# Patient Record
Sex: MALE | Race: WHITE | NOT HISPANIC OR LATINO | Employment: FULL TIME | URBAN - METROPOLITAN AREA
[De-identification: names, ages, dates, MRNs, and addresses within clinical notes are randomized per-mention and may not be internally consistent; named-entity substitution may affect disease eponyms.]

---

## 2022-03-04 ENCOUNTER — OFFICE VISIT (OUTPATIENT)
Dept: PEDIATRICS | Facility: CLINIC | Age: 57
End: 2022-03-04
Attending: NURSE PRACTITIONER
Payer: COMMERCIAL

## 2022-03-04 ENCOUNTER — HOSPITAL ENCOUNTER (OUTPATIENT)
Dept: ULTRASOUND IMAGING | Facility: CLINIC | Age: 57
Discharge: HOME | End: 2022-03-04
Attending: PHYSICIAN ASSISTANT | Admitting: PHYSICIAN ASSISTANT
Payer: COMMERCIAL

## 2022-03-04 ENCOUNTER — OFFICE VISIT (OUTPATIENT)
Dept: PEDIATRICS | Facility: CLINIC | Age: 57
End: 2022-03-04
Payer: COMMERCIAL

## 2022-03-04 VITALS
DIASTOLIC BLOOD PRESSURE: 70 MMHG | OXYGEN SATURATION: 98 % | HEART RATE: 63 BPM | TEMPERATURE: 96.7 F | WEIGHT: 167.4 LBS | HEIGHT: 70 IN | RESPIRATION RATE: 16 BRPM | SYSTOLIC BLOOD PRESSURE: 110 MMHG | BODY MASS INDEX: 23.96 KG/M2

## 2022-03-04 VITALS
TEMPERATURE: 97.9 F | DIASTOLIC BLOOD PRESSURE: 75 MMHG | BODY MASS INDEX: 23.96 KG/M2 | OXYGEN SATURATION: 100 % | WEIGHT: 167 LBS | SYSTOLIC BLOOD PRESSURE: 113 MMHG | RESPIRATION RATE: 18 BRPM | HEART RATE: 63 BPM

## 2022-03-04 DIAGNOSIS — M79.662 PAIN AND SWELLING OF LEFT LOWER LEG: ICD-10-CM

## 2022-03-04 DIAGNOSIS — M79.89 PAIN AND SWELLING OF LEFT LOWER LEG: ICD-10-CM

## 2022-03-04 DIAGNOSIS — M79.89 PAIN AND SWELLING OF LEFT LOWER LEG: Primary | ICD-10-CM

## 2022-03-04 DIAGNOSIS — M79.662 PAIN AND SWELLING OF LEFT LOWER LEG: Primary | ICD-10-CM

## 2022-03-04 DIAGNOSIS — M67.40 GANGLION CYST: ICD-10-CM

## 2022-03-04 PROCEDURE — 99204 OFFICE O/P NEW MOD 45 MIN: CPT | Performed by: PHYSICIAN ASSISTANT

## 2022-03-04 PROCEDURE — 99207 REFERRAL TO ACUTE AND DIAGNOSTIC SERVICES: CPT | Performed by: NURSE PRACTITIONER

## 2022-03-04 PROCEDURE — 93971 EXTREMITY STUDY: CPT | Mod: LT

## 2022-03-04 NOTE — PROGRESS NOTES
Assessment & Plan     Pain and swelling of left lower leg  Rule out left lower extremity DVT, referral to [facility] this morning at 10:15 am.  - Referral to Acute and Diagnostic Services (Day of diagnostic / First order acute); Future    Ganglion cyst  - Orthopedic  Referral; Future      37 minutes spent on the date of the encounter doing chart review, patient visit, documentation and discussion with other provider(s)        CONSULTATION/REFERRAL to Acute and Diagnostic Services    Return today (on 3/4/2022) for Acute Diagnostic Services.    LEILA Myers [facility] ALEJANDRA Aldana is a 56 year old who presents for the following health issues:    Reports a 5-7 day history of left calf pain and development of lump in calf x 2 days.  History of left knee problems, s/p meniscus repair. Typically has some clicking and popping in the left knee.  Was recently working on some home improvement projects requiring him to be on his knees. When placing pressure on his knee this typically results in knee swelling that sometimes affects the ankle as well. Started using sleeve in knee for compression. Following this he developed localized left calf pain. Initially thought it was a bruise but didn't recall any trauma. Then he noticed a lump. 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 Denies chest pain or shortness of breath.     Doesn't like going to the doctor, has not had a routine physical in several years. Behind on several routine health maintenance topics.     Daily meds:  Baby aspirin  Omega 3 fatty acid  Vitamin D  Quercetin with Bromelain  Geletin  Tumeric  Sometimes magnesium/zinc    There is no problem list on file for this patient.    History reviewed. No pertinent past medical history.    No current outpatient medications on file.     No  "current facility-administered medications for this visit.      No Known Allergies      Review of Systems    ROS: 10 point ROS neg other than the symptoms noted above in the HPI.        Objective    /70 (BP Location: Right arm, Patient Position: Sitting, Cuff Size: Adult Regular)   Pulse 63   Temp (!) 96.7  F (35.9  C) (Tympanic)   Resp 16   Ht 1.778 m (5' 10\")   Wt 75.9 kg (167 lb 6.4 oz)   SpO2 98%   BMI 24.02 kg/m    Body mass index is 24.02 kg/m .  Physical Exam  Constitutional:       General: He is not in acute distress.     Appearance: Normal appearance. He is not ill-appearing or toxic-appearing.   Cardiovascular:      Rate and Rhythm: Normal rate.   Pulmonary:      Effort: Pulmonary effort is normal. No respiratory distress.   Musculoskeletal:      Left lower leg: Swelling and tenderness present.      Left ankle: Swelling present.      Comments: Left medial calf with localized swelling from about 6 inches below knee extending upward, firm on palpation. No external skin changes.    Skin:     General: Skin is warm and dry.   Neurological:      General: No focal deficit present.      Mental Status: He is alert and oriented to person, place, and time.   Psychiatric:         Mood and Affect: Mood normal.         Behavior: Behavior normal.                    "

## 2022-03-04 NOTE — PROGRESS NOTES
"  Assessment/Plan:    No DVT on ultrasound. Complex fluid collection noted which corresponds to area of swelling/tenderness in L calf, nonspecific but may represent hematoma or abscess. No erythema or warmth, not fluctuant- do not suspect abscess. Pt admits he does bang his legs on the dashboard of his car frequently and \"doesn't think much of it\" so may have had trauma to the area. Suspect hematoma. Advised RICE, compression, NSAIDs/Tylenol PRN. F/u with ortho if not better in 1 week, may benefit from MRI at that point. Referral for ortho placed.     See patient instructions below.    At the end of the encounter, I discussed results, diagnosis, medications. Discussed red flags for immediate return to clinic/ER, as well as indications for follow up if no improvement. Patient understood and agreed to plan. Patient was stable for discharge.      ICD-10-CM    1. Pain and swelling of left lower leg  M79.662 Referral to Acute and Diagnostic Services (Day of diagnostic / First order acute)    M79.89 US Lower Extremity Venous Duplex Left     Orthopedic  Referral         Return in about 1 week (around 3/11/2022) for follow up with ortho.    MARCELLE Grady, PASANTOS  [facility]  -----------------------------------------------------------------------------------------------------------------------------------------------------    HPI:  Jovan Ely is a 56 year old male who presents for evaluation of the following:    Musculoskeletal problem/pain  Onset/Duration: X 1 week  Description  Location: L calf  Joint Swelling: YES, developed lump in calf 2 days ago  Redness: no   Pain: 1/10  Warmth: no   Progression of Symptoms:  same  Accompanying signs and symptoms:   Fevers: no   Numbness/tingling/weakness: no   History  Trauma to the area: no   Previous history of Gout: no    Alcohol usage: Moderate  Diuretic Use: no   Recent illness:  no   Previous similar problem:no    Previous " "evaluation:  no  Aggravating factors include: standing, walking, climbing stairs and exercise  Therapies tried and outcome:  elevating leg and compression sleeve has helped.    Seen at primary care office this AM. From their note:   \"History of left knee problems, s/p meniscus repair. Typically has some clicking and popping in the left knee.   Was recently working on some home improvement projects requiring him to be on his knees. When placing pressure on his knee this typically results in knee swelling that sometimes affects the ankle as well. Started using sleeve in knee for compression. Following this he developed localized left calf pain. Initially thought it was a bruise but didn't recall any trauma. Then he noticed a lump a couple of days ago. 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 Denies chest pain or shortness of breath. \"    Pt notes the knee itself has been swelling intermittently over the past couple of months, but swollen area in calf is new.  No prior hx of VTE.    History reviewed. No pertinent past medical history.    Vitals:    03/04/22 0955   BP: 113/75   BP Location: Left arm   Patient Position: Chair   Cuff Size: Adult Large   Pulse: 63   Resp: 18   Temp: 97.9  F (36.6  C)   TempSrc: Oral   SpO2: 100%   Weight: 75.8 kg (167 lb)       Physical Exam  Vitals and nursing note reviewed.   Cardiovascular:      Pulses:           Posterior tibial pulses are 2+ on the left side.   Pulmonary:      Effort: Pulmonary effort is normal.   Musculoskeletal:      Left knee: No swelling or effusion.      Left lower leg: Swelling (L ankle mildly larger than R ankle. no pitting) and tenderness present.        Legs:    Neurological:      Mental Status: He is alert.         Labs/Imaging:  No results found for this or any previous visit (from the past 24 hour(s)).  Results for " orders placed or performed during the hospital encounter of 03/04/22   US Lower Extremity Venous Duplex Left     Status: None (Preliminary result)    Narrative    US LOWER EXTREMITY VENOUS DUPLEX LEFT   3/4/2022 11:00 AM     HISTORY: Left calf pain and swelling.    COMPARISON: None.    TECHNIQUE: Spectral doppler and waveform analysis were performed on  the left lower extremity veins.    FINDINGS: The left common femoral, femoral, and popliteal veins are  patent, compressible, and negative for deep venous thrombosis. Calf  veins are segmentally seen but appear negative for DVT where  visualized. An elongated complex fluid collection in the left proximal  calf medially corresponds to the region of the patient's pain and  swelling, and measures 11.1 x 4.5 x 1.8 cm.      Impression    IMPRESSION:    1. No evidence for deep venous thrombosis in the left lower extremity  veins.   2. An elongated complex fluid collection in the left proximal calf  medially corresponds to the region of the patient's pain and swelling.  This finding is nonspecific, but may represent a hematoma or possibly  an abscess. Please clinically correlate.    I phone these results to ELIJAH Ramon at 11:54 AM on 3/4/2022.       Patient Instructions   1) Take Ibuprofen 600 mg 3-4 times daily with food as needed for pain. If this is not sufficient for pain control it may be combined with Tylenol up to 1000 mg four times a day.   2) Ice to the affected area 20 minutes three times daily.  3) Elevate the affected injury above the level of the heart when you are able to.   4) Compress the affected area using an ACE wrap or brace. This will help to keep swelling down and helps with pain relief.   5) Follow up with ortho in 1 week for recheck.

## 2022-03-04 NOTE — PATIENT INSTRUCTIONS
1) Take Ibuprofen 600 mg 3-4 times daily with food as needed for pain. If this is not sufficient for pain control it may be combined with Tylenol up to 1000 mg four times a day.   2) Ice to the affected area 20 minutes three times daily.  3) Elevate the affected injury above the level of the heart when you are able to.   4) Compress the affected area using an ACE wrap or brace. This will help to keep swelling down and helps with pain relief.   5) Follow up with ortho in 1 week for recheck.

## 2022-03-04 NOTE — Clinical Note
Hello! Sent this rufina to [facility] for same day ultrasound - couldn't recall if coding should be specific to that. I billed [id number] but wondering if that is right! Thank you!

## 2022-03-04 NOTE — PROGRESS NOTES
"  {PROVIDER CHARTING PREFERENCE:020808}    Subjective   Jovan is a 56 year old who presents for the following health issues {ACCOMPANIED BY STATEMENT (Optional):567857}    HPI     Musculoskeletal problem/pain  Onset/Duration: X 1 week  Description  Location: leg - left  Joint Swelling: YES, developed lump in calf 2 days ago  Redness: no   Pain: 1/10  Warmth: no   Progression of Symptoms:  same  Accompanying signs and symptoms:   Fevers: no   Numbness/tingling/weakness: no   History  Trauma to the area: no   Previous history of Gout: no    Alcohol usage: Moderate  Diuretic Use: no   Recent illness:  no   Previous similar problem:no    Previous evaluation:  no  Aggravating factors include: standing, walking, climbing stairs and exercise  Therapies tried and outcome:  elevating leg and compression sleeve has helped.    Seen at primary care office this AM. From their note:   \"History of left knee problems, s/p meniscus repair. Typically has some clicking and popping in the left knee.  Was recently working on some home improvement projects requiring him to be on his knees. When placing pressure on his knee this typically results in knee swelling that sometimes affects the ankle as well. Started using sleeve in knee for compression. Following this he developed localized left calf pain. Initially thought it was a bruise but didn't recall any trauma. Then he noticed a lump. 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 Denies chest pain or shortness of breath. \"    {additonal problems for provider to add (Optional):248433}    Review of Systems   {ROS COMP (Optional):716109}      Objective    Wt 75.8 kg (167 lb)   BMI 23.96 kg/m    Body mass index is 23.96 kg/m .  Physical Exam   {Exam List (Optional):093775}    {Diagnostic Test Results (Optional):367788}    {AMBULATORY ATTESTATION " (Optional):941953}

## 2022-04-10 ENCOUNTER — HEALTH MAINTENANCE LETTER (OUTPATIENT)
Age: 57
End: 2022-04-10

## 2022-10-15 ENCOUNTER — HEALTH MAINTENANCE LETTER (OUTPATIENT)
Age: 57
End: 2022-10-15

## 2023-06-01 ENCOUNTER — HEALTH MAINTENANCE LETTER (OUTPATIENT)
Age: 58
End: 2023-06-01

## 2024-08-04 ENCOUNTER — HEALTH MAINTENANCE LETTER (OUTPATIENT)
Age: 59
End: 2024-08-04